# Patient Record
Sex: FEMALE | Race: WHITE | NOT HISPANIC OR LATINO | URBAN - METROPOLITAN AREA
[De-identification: names, ages, dates, MRNs, and addresses within clinical notes are randomized per-mention and may not be internally consistent; named-entity substitution may affect disease eponyms.]

---

## 2024-09-17 ENCOUNTER — OFFICE VISIT (OUTPATIENT)
Dept: URGENT CARE | Facility: URGENT CARE | Age: 69
End: 2024-09-17
Payer: COMMERCIAL

## 2024-09-17 VITALS
SYSTOLIC BLOOD PRESSURE: 110 MMHG | RESPIRATION RATE: 20 BRPM | HEART RATE: 65 BPM | OXYGEN SATURATION: 96 % | DIASTOLIC BLOOD PRESSURE: 72 MMHG | TEMPERATURE: 97.3 F

## 2024-09-17 DIAGNOSIS — S61.214A LACERATION OF RIGHT RING FINGER WITHOUT FOREIGN BODY WITHOUT DAMAGE TO NAIL, INITIAL ENCOUNTER: Primary | ICD-10-CM

## 2024-09-17 PROCEDURE — 12001 RPR S/N/AX/GEN/TRNK 2.5CM/<: CPT | Performed by: FAMILY MEDICINE

## 2024-09-17 NOTE — PROGRESS NOTES
SUBJECTIVE: [email or web address] who presents to the clinic with a laceration on the finger sustained hour(s) ago.    This is a accidental injury.    Mechanism of injury: sheet metal.  Associated symptoms: Denies numbness, weakness, or loss of function  Last tetanus booster within 10 years: yes    Past Medical History:   Diagnosis Date    ACUTE CHOLECYSTITIS 9/03    ACUTE PANCREATITIS 9/03    Displacement of lumbar intervertebral disc without myelopathy     ESOPHAGEAL REFLUX 4/7/2003    Family history of diabetes mellitus     FAMILY HX- THYROID      HYPOTHYROIDISM      R LATERAL EPICONDYLITIS 6/11/2003    Unspecified closed fracture of ankle      Allergies   Allergen Reactions    Amoxicillin Hives     hives    Erythromycin      high dose  dental-- N & V    Itraconazole      sporanox--rash    Penicillins Hives     hives    Tetracycline       photo sensative     Social History     Tobacco Use    Smoking status: Never    Smokeless tobacco: Not on file   Substance Use Topics    Alcohol use: Yes     Comment: Approx 0- drinks a month       ROS:  Neuro: good distal sensation  Motor: normal rom and strenght  Hem: capillary refill < 2 sec    EXAM: The patient appears today in alert,no apparent distress distressVITALS:   /72   Pulse 65   Temp 97.3  F (36.3  C)   Resp 20   SpO2 96%   Size of laceration: 1 centimeters  Characteristics of the laceration: clean and straight  Tendon function intact: yes  Sensation to light touch intact: yes  Pulses/Capillary refill intact: yes      ICD-10-CM    1. Laceration of right ring finger without foreign body without damage to nail, initial encounter  S61.214A REPAIR SUPERFICIAL, WOUND BODY < =2.5CM          Procedure Note:Wound injected with 1 cc's of Lidocaine 1% plain  Good anesthesia was obtainedPrepped and draped in the usual sterile fashion  Wound cleaned with sterile water  Wound soakedLaceration was closed using 2 5-0 nylon interrupted sutures  After care instructions:Keep wound  clean   Sutures out in 10 days  Signs of infection discussed today